# Patient Record
Sex: FEMALE
[De-identification: names, ages, dates, MRNs, and addresses within clinical notes are randomized per-mention and may not be internally consistent; named-entity substitution may affect disease eponyms.]

---

## 2023-08-24 PROBLEM — R06.02 SHORTNESS OF BREATH: Status: ACTIVE | Noted: 2023-08-24

## 2023-08-24 PROBLEM — F41.9 ANXIETY AND DEPRESSION: Status: ACTIVE | Noted: 2023-08-24

## 2023-08-24 PROBLEM — R52 PAIN: Status: ACTIVE | Noted: 2023-08-24

## 2023-08-24 PROBLEM — F39 MOOD DISORDER (CMS-HCC): Status: ACTIVE | Noted: 2023-08-24

## 2023-08-24 PROBLEM — M54.9 BACKACHE: Status: ACTIVE | Noted: 2023-08-24

## 2023-08-24 PROBLEM — R07.9 CHEST PAIN: Status: ACTIVE | Noted: 2023-08-24

## 2023-08-24 PROBLEM — E78.2 MIXED HYPERLIPIDEMIA: Status: ACTIVE | Noted: 2023-08-24

## 2023-08-24 PROBLEM — I10 ESSENTIAL HYPERTENSION: Status: ACTIVE | Noted: 2023-08-24

## 2023-08-24 PROBLEM — R09.1 PLEURISY: Status: ACTIVE | Noted: 2023-08-24

## 2023-08-24 PROBLEM — F32.A ANXIETY AND DEPRESSION: Status: ACTIVE | Noted: 2023-08-24

## 2023-08-24 RX ORDER — ARIPIPRAZOLE 5 MG/1
1 TABLET ORAL EVERY MORNING
COMMUNITY
End: 2023-10-09 | Stop reason: SDUPTHER

## 2023-08-24 RX ORDER — ALPRAZOLAM 0.25 MG/1
TABLET ORAL DAILY PRN
COMMUNITY
End: 2023-11-15 | Stop reason: SDUPTHER

## 2023-08-24 RX ORDER — VENLAFAXINE HYDROCHLORIDE 75 MG/1
1 CAPSULE, EXTENDED RELEASE ORAL 2 TIMES DAILY
COMMUNITY
End: 2023-10-09 | Stop reason: SDUPTHER

## 2023-10-06 ENCOUNTER — TELEPHONE (OUTPATIENT)
Dept: OTHER | Age: 68
End: 2023-10-06
Payer: MEDICARE

## 2023-10-06 NOTE — TELEPHONE ENCOUNTER
Requesting refills on medications.   Alprazolam 0.25  Aripiprazole 5mg  Venlafaxine 75mg     Thank you!

## 2023-10-09 ENCOUNTER — TELEPHONE (OUTPATIENT)
Dept: BEHAVIORAL HEALTH | Facility: CLINIC | Age: 68
End: 2023-10-09
Payer: MEDICARE

## 2023-10-09 DIAGNOSIS — F41.9 ANXIETY AND DEPRESSION: Primary | ICD-10-CM

## 2023-10-09 DIAGNOSIS — F32.A ANXIETY AND DEPRESSION: Primary | ICD-10-CM

## 2023-10-09 RX ORDER — VENLAFAXINE HYDROCHLORIDE 75 MG/1
75 CAPSULE, EXTENDED RELEASE ORAL 2 TIMES DAILY
Qty: 60 CAPSULE | Refills: 0 | Status: SHIPPED | OUTPATIENT
Start: 2023-10-09 | End: 2023-11-07

## 2023-10-09 RX ORDER — ARIPIPRAZOLE 5 MG/1
5 TABLET ORAL EVERY MORNING
Qty: 30 TABLET | Refills: 0 | Status: SHIPPED | OUTPATIENT
Start: 2023-10-09 | End: 2023-11-07

## 2023-10-11 ENCOUNTER — APPOINTMENT (OUTPATIENT)
Dept: BEHAVIORAL HEALTH | Facility: CLINIC | Age: 68
End: 2023-10-11
Payer: MEDICARE

## 2023-11-10 RX ORDER — VENLAFAXINE HYDROCHLORIDE 37.5 MG/1
CAPSULE, EXTENDED RELEASE ORAL
COMMUNITY
Start: 2023-06-23 | End: 2023-11-15 | Stop reason: WASHOUT

## 2023-11-10 RX ORDER — DICLOFENAC SODIUM 10 MG/G
GEL TOPICAL
COMMUNITY
Start: 2023-08-02

## 2023-11-15 ENCOUNTER — OFFICE VISIT (OUTPATIENT)
Dept: BEHAVIORAL HEALTH | Facility: CLINIC | Age: 68
End: 2023-11-15
Payer: MEDICARE

## 2023-11-15 VITALS
WEIGHT: 109.2 LBS | DIASTOLIC BLOOD PRESSURE: 73 MMHG | TEMPERATURE: 98.2 F | SYSTOLIC BLOOD PRESSURE: 134 MMHG | HEART RATE: 106 BPM | BODY MASS INDEX: 18.64 KG/M2 | HEIGHT: 64 IN

## 2023-11-15 DIAGNOSIS — F41.9 ANXIETY AND DEPRESSION: ICD-10-CM

## 2023-11-15 DIAGNOSIS — F32.A ANXIETY AND DEPRESSION: ICD-10-CM

## 2023-11-15 PROCEDURE — 1159F MED LIST DOCD IN RCRD: CPT | Performed by: PSYCHIATRY & NEUROLOGY

## 2023-11-15 PROCEDURE — 1160F RVW MEDS BY RX/DR IN RCRD: CPT | Performed by: PSYCHIATRY & NEUROLOGY

## 2023-11-15 PROCEDURE — 3075F SYST BP GE 130 - 139MM HG: CPT | Performed by: PSYCHIATRY & NEUROLOGY

## 2023-11-15 PROCEDURE — 99215 OFFICE O/P EST HI 40 MIN: CPT | Performed by: PSYCHIATRY & NEUROLOGY

## 2023-11-15 PROCEDURE — 3078F DIAST BP <80 MM HG: CPT | Performed by: PSYCHIATRY & NEUROLOGY

## 2023-11-15 RX ORDER — ARIPIPRAZOLE 5 MG/1
5 TABLET ORAL
Qty: 90 TABLET | Refills: 1 | Status: SHIPPED | OUTPATIENT
Start: 2023-11-15 | End: 2023-11-30 | Stop reason: SDUPTHER

## 2023-11-15 RX ORDER — VENLAFAXINE HYDROCHLORIDE 150 MG/1
150 CAPSULE, EXTENDED RELEASE ORAL DAILY
Qty: 90 CAPSULE | Refills: 1 | Status: SHIPPED | OUTPATIENT
Start: 2023-11-15 | End: 2023-11-30 | Stop reason: SINTOL

## 2023-11-15 RX ORDER — ALPRAZOLAM 0.25 MG/1
TABLET ORAL
Qty: 15 TABLET | Refills: 2 | Status: SHIPPED | OUTPATIENT
Start: 2023-11-15 | End: 2024-01-24 | Stop reason: WASHOUT

## 2023-11-15 ASSESSMENT — ENCOUNTER SYMPTOMS
NEUROLOGICAL NEGATIVE: 1
NERVOUS/ANXIOUS: 1

## 2023-11-15 NOTE — PROGRESS NOTES
Subjective   Patient ID: Ashanti Willis is a 68 y.o. female who presents for No chief complaint on file.. I am having a lot of anxiety.       The patient is alert, fully oriented, language is intact, and recent and remote memory intact. The patient denies any suicidal or homicidal ideation or plans. The patient presents with no manic or psychotic symptoms. Thought is logical and clear. No disturbances of judgment or insight are exhibited. No behavioral disturbances are present on examination. The patient report having a lot of anxiety. The patient is seen with her brother(Raulito Willis ). The patient lives with her mother and she is taking care of her mother who has dementia. The patient is working with her brother to save money so she can support herself. The patient's brother has been managing the patient's money since the patient has been a compulsive spender. The patient reports that she has complexes about her looks. She also has a history of anorexia. During high school the patient suffered from depression. People made fun of her in school. In public, she believes that people are judging her. The patient does not like what she sees when she looks in the mirror.  The patient hates looking in the mirror and hates what she sees. The patient counts in her head and if she does not do that she thinks something bad would happen. The patient continues to manifest obsessive compulsive behaviors.   The patient reports that when she went off the aripiprazole in the past she did very poorly with increased depression, diminished self image and increased discomfort with others.       Review of Systems   Neurological: Negative.         The patient is alert, fully oriented, language is intact, and recent and remote memory intact. The patient denies any suicidal or homicidal ideation or plans. The patient presents with no manic or psychotic symptoms. Thought is logical and clear. No disturbances of judgment or  insight are exhibited. No behavioral disturbances are present on examination. The patient report having a lot of anxiety. The patient is seen with her brother(Raulito Parmar [phone]). The patient lives with her mother and she is taking care of her mother who has dementia.        Psychiatric/Behavioral:  The patient is nervous/anxious.         The patient is alert, fully oriented, language is intact, and recent and remote memory intact. The patient denies any suicidal or homicidal ideation or plans. The patient presents with no manic or psychotic symptoms. Thought is logical and clear. No disturbances of judgment or insight are exhibited. No behavioral disturbances are present on examination. The patient report having a lot of anxiety. The patient is seen with her brother(Raulito Parmar [phone]). The patient lives with her mother and she is taking care of her mother who has dementia.        All other systems reviewed and are negative.      Objective   Physical Exam  Vitals reviewed.   Neurological:      General: No focal deficit present.      Mental Status: She is alert and oriented to person, place, and time. Mental status is at baseline.      Comments: The patient is alert, fully oriented, language is intact, and recent and remote memory intact. The patient denies any suicidal or homicidal ideation or plans. The patient presents with no manic or psychotic symptoms. Thought is logical and clear. No disturbances of judgment or insight are exhibited. No behavioral disturbances are present on examination. The patient report having a lot of anxiety. The patient is seen with her brother(Raulito Parmar [phone]). The patient lives with her mother and she is taking care of her mother who has dementia.        Psychiatric:      Comments: The patient is alert, fully oriented, language is intact, and recent and remote memory intact. The patient denies any suicidal or homicidal ideation or plans. The patient presents  with no manic or psychotic symptoms. Thought is logical and clear. No disturbances of judgment or insight are exhibited. No behavioral disturbances are present on examination. The patient report having a lot of anxiety. The patient is seen with her brother(Raulito Willis ). The patient lives with her mother and she is taking care of her mother who has dementia.              Lab Review:       Assessment/Plan   The risks, benefits & alternatives to the medications prescribed were explained to you and your support person, your brother, today. You & your support person were able to understand & repeat these risks, benefits & alternatives to this prescribed medication. You and your support persons have agreed to proceed with treatment with the medications discussed based on the conclusion that the benefit outweighs the risks of this treatment regimen: continue aripiprazole 5 mg daily, venlafaxine  mg daily and alprazolam 0.125 mg daily at bedtime. We recommend you resume your exercise program. We may consider increasing aripiprazole in the future for your self image and fears as well as your depression. We  may consider increasing venlafaxine  or switch to another agent in the future to deal with your obsessive compulsive depressive features. Follow up in 4 to 6 weeks or sooner if needed.

## 2023-11-30 DIAGNOSIS — F39 MOOD DISORDER (CMS-HCC): ICD-10-CM

## 2023-11-30 DIAGNOSIS — F41.9 ANXIETY AND DEPRESSION: ICD-10-CM

## 2023-11-30 DIAGNOSIS — F32.A ANXIETY AND DEPRESSION: ICD-10-CM

## 2023-11-30 RX ORDER — VENLAFAXINE HYDROCHLORIDE 75 MG/1
75 CAPSULE, EXTENDED RELEASE ORAL 2 TIMES DAILY
Qty: 180 CAPSULE | Refills: 1 | Status: SHIPPED | OUTPATIENT
Start: 2023-11-30 | End: 2024-01-24 | Stop reason: SDUPTHER

## 2023-11-30 RX ORDER — ARIPIPRAZOLE 5 MG/1
5 TABLET ORAL
Qty: 90 TABLET | Refills: 1 | Status: SHIPPED | OUTPATIENT
Start: 2023-11-30 | End: 2024-01-24 | Stop reason: SDUPTHER

## 2024-01-09 ENCOUNTER — APPOINTMENT (OUTPATIENT)
Dept: BEHAVIORAL HEALTH | Facility: CLINIC | Age: 69
End: 2024-01-09
Payer: MEDICARE

## 2024-01-10 ENCOUNTER — APPOINTMENT (OUTPATIENT)
Dept: BEHAVIORAL HEALTH | Facility: CLINIC | Age: 69
End: 2024-01-10
Payer: MEDICARE

## 2024-01-15 ENCOUNTER — APPOINTMENT (OUTPATIENT)
Dept: BEHAVIORAL HEALTH | Facility: CLINIC | Age: 69
End: 2024-01-15
Payer: MEDICARE

## 2024-01-24 ENCOUNTER — OFFICE VISIT (OUTPATIENT)
Dept: BEHAVIORAL HEALTH | Facility: CLINIC | Age: 69
End: 2024-01-24
Payer: MEDICARE

## 2024-01-24 DIAGNOSIS — Z79.899 MEDICATION MANAGEMENT: ICD-10-CM

## 2024-01-24 DIAGNOSIS — R41.3 MEMORY LOSS, SHORT TERM: ICD-10-CM

## 2024-01-24 DIAGNOSIS — F32.A ANXIETY AND DEPRESSION: ICD-10-CM

## 2024-01-24 DIAGNOSIS — F41.9 ANXIETY AND DEPRESSION: ICD-10-CM

## 2024-01-24 DIAGNOSIS — R41.840 COGNITIVE ATTENTION DEFICIT: ICD-10-CM

## 2024-01-24 DIAGNOSIS — R41.844 EXECUTIVE FUNCTION DEFICIT: ICD-10-CM

## 2024-01-24 DIAGNOSIS — F39 MOOD DISORDER (CMS-HCC): ICD-10-CM

## 2024-01-24 PROCEDURE — 99215 OFFICE O/P EST HI 40 MIN: CPT | Performed by: PSYCHIATRY & NEUROLOGY

## 2024-01-24 PROCEDURE — 1160F RVW MEDS BY RX/DR IN RCRD: CPT | Performed by: PSYCHIATRY & NEUROLOGY

## 2024-01-24 PROCEDURE — 1159F MED LIST DOCD IN RCRD: CPT | Performed by: PSYCHIATRY & NEUROLOGY

## 2024-01-24 RX ORDER — ARIPIPRAZOLE 5 MG/1
5 TABLET ORAL
Qty: 90 TABLET | Refills: 1 | Status: SHIPPED | OUTPATIENT
Start: 2024-01-24 | End: 2024-04-24 | Stop reason: SDUPTHER

## 2024-01-24 RX ORDER — VENLAFAXINE HYDROCHLORIDE 75 MG/1
75 CAPSULE, EXTENDED RELEASE ORAL 2 TIMES DAILY
Qty: 180 CAPSULE | Refills: 1 | Status: SHIPPED | OUTPATIENT
Start: 2024-01-24 | End: 2024-04-24 | Stop reason: SDUPTHER

## 2024-01-24 SDOH — HEALTH STABILITY: PHYSICAL HEALTH: ON AVERAGE, HOW MANY MINUTES DO YOU ENGAGE IN EXERCISE AT THIS LEVEL?: 10 MIN

## 2024-01-24 SDOH — ECONOMIC STABILITY: HOUSING INSECURITY: IN THE LAST 12 MONTHS, HOW MANY PLACES HAVE YOU LIVED?: 1

## 2024-01-24 SDOH — ECONOMIC STABILITY: HOUSING INSECURITY
IN THE LAST 12 MONTHS, WAS THERE A TIME WHEN YOU DID NOT HAVE A STEADY PLACE TO SLEEP OR SLEPT IN A SHELTER (INCLUDING NOW)?: NO

## 2024-01-24 SDOH — ECONOMIC STABILITY: FOOD INSECURITY: WITHIN THE PAST 12 MONTHS, THE FOOD YOU BOUGHT JUST DIDN'T LAST AND YOU DIDN'T HAVE MONEY TO GET MORE.: NEVER TRUE

## 2024-01-24 SDOH — ECONOMIC STABILITY: TRANSPORTATION INSECURITY
IN THE PAST 12 MONTHS, HAS THE LACK OF TRANSPORTATION KEPT YOU FROM MEDICAL APPOINTMENTS OR FROM GETTING MEDICATIONS?: NO

## 2024-01-24 SDOH — ECONOMIC STABILITY: FOOD INSECURITY: WITHIN THE PAST 12 MONTHS, YOU WORRIED THAT YOUR FOOD WOULD RUN OUT BEFORE YOU GOT MONEY TO BUY MORE.: NEVER TRUE

## 2024-01-24 SDOH — ECONOMIC STABILITY: INCOME INSECURITY: IN THE LAST 12 MONTHS, WAS THERE A TIME WHEN YOU WERE NOT ABLE TO PAY THE MORTGAGE OR RENT ON TIME?: NO

## 2024-01-24 SDOH — ECONOMIC STABILITY: TRANSPORTATION INSECURITY
IN THE PAST 12 MONTHS, HAS LACK OF TRANSPORTATION KEPT YOU FROM MEETINGS, WORK, OR FROM GETTING THINGS NEEDED FOR DAILY LIVING?: NO

## 2024-01-24 SDOH — ECONOMIC STABILITY: GENERAL: WHICH OF THE FOLLOWING DO YOU KNOW HOW TO USE AND HAVE ACCESS TO EVERY DAY? (CHOOSE ALL THAT APPLY): NONE OF THESE

## 2024-01-24 SDOH — ECONOMIC STABILITY: GENERAL
WHICH OF THE FOLLOWING WOULD YOU LIKE TO GET CONNECTED TO IN ORDER TO RECEIVE A DISCOUNT OR FOR FREE? (CHOOSE ALL THAT APPLY): NONE OF THESE

## 2024-01-24 SDOH — HEALTH STABILITY: PHYSICAL HEALTH: ON AVERAGE, HOW MANY DAYS PER WEEK DO YOU ENGAGE IN MODERATE TO STRENUOUS EXERCISE (LIKE A BRISK WALK)?: 7 DAYS

## 2024-01-24 ASSESSMENT — PATIENT HEALTH QUESTIONNAIRE - PHQ9
SUM OF ALL RESPONSES TO PHQ9 QUESTIONS 1 & 2: 2
2. FEELING DOWN, DEPRESSED OR HOPELESS: SEVERAL DAYS
10. IF YOU CHECKED OFF ANY PROBLEMS, HOW DIFFICULT HAVE THESE PROBLEMS MADE IT FOR YOU TO DO YOUR WORK, TAKE CARE OF THINGS AT HOME, OR GET ALONG WITH OTHER PEOPLE: SOMEWHAT DIFFICULT
1. LITTLE INTEREST OR PLEASURE IN DOING THINGS: SEVERAL DAYS

## 2024-01-24 ASSESSMENT — SOCIAL DETERMINANTS OF HEALTH (SDOH)
HOW HARD IS IT FOR YOU TO PAY FOR THE VERY BASICS LIKE FOOD, HOUSING, MEDICAL CARE, AND HEATING?: NOT HARD AT ALL
WITHIN THE LAST YEAR, HAVE TO BEEN RAPED OR FORCED TO HAVE ANY KIND OF SEXUAL ACTIVITY BY YOUR PARTNER OR EX-PARTNER?: NO
WITHIN THE LAST YEAR, HAVE YOU BEEN HUMILIATED OR EMOTIONALLY ABUSED IN OTHER WAYS BY YOUR PARTNER OR EX-PARTNER?: NO
HOW OFTEN DO YOU GET TOGETHER WITH FRIENDS OR RELATIVES?: MORE THAN THREE TIMES A WEEK
DO YOU BELONG TO ANY CLUBS OR ORGANIZATIONS SUCH AS CHURCH GROUPS UNIONS, FRATERNAL OR ATHLETIC GROUPS, OR SCHOOL GROUPS?: NO
HOW OFTEN DO YOU ATTENT MEETINGS OF THE CLUB OR ORGANIZATION YOU BELONG TO?: 1 TO 4 TIMES PER YEAR
WITHIN THE LAST YEAR, HAVE YOU BEEN AFRAID OF YOUR PARTNER OR EX-PARTNER?: NO
HOW OFTEN DO YOU ATTEND CHURCH OR RELIGIOUS SERVICES?: 1 TO 4 TIMES PER YEAR
IN THE PAST 12 MONTHS, HAS THE ELECTRIC, GAS, OIL, OR WATER COMPANY THREATENED TO SHUT OFF SERVICE IN YOUR HOME?: NO
IN A TYPICAL WEEK, HOW MANY TIMES DO YOU TALK ON THE PHONE WITH FAMILY, FRIENDS, OR NEIGHBORS?: MORE THAN THREE TIMES A WEEK
WITHIN THE LAST YEAR, HAVE YOU BEEN KICKED, HIT, SLAPPED, OR OTHERWISE PHYSICALLY HURT BY YOUR PARTNER OR EX-PARTNER?: NO

## 2024-01-24 ASSESSMENT — LIFESTYLE VARIABLES
HOW OFTEN DO YOU HAVE A DRINK CONTAINING ALCOHOL: NEVER
AUDIT-C TOTAL SCORE: 0
SKIP TO QUESTIONS 9-10: 1
HOW MANY STANDARD DRINKS CONTAINING ALCOHOL DO YOU HAVE ON A TYPICAL DAY: PATIENT DOES NOT DRINK
HOW OFTEN DO YOU HAVE SIX OR MORE DRINKS ON ONE OCCASION: NEVER

## 2024-01-24 ASSESSMENT — ENCOUNTER SYMPTOMS
NERVOUS/ANXIOUS: 1
DYSPHORIC MOOD: 1

## 2024-01-24 NOTE — PROGRESS NOTES
Subjective   Patient ID: Ashanti Willis is a 68 y.o. female who presents for No chief complaint on file. I have been extra tired.     The patient is alert, fully oriented, language is intact, and recent and remote memory intact. The patient denies any suicidal or homicidal ideation or plans. The patient presents with no depressive, manic or psychotic symptoms. Thought is logical and clear. No disturbances of judgment or insight are exhibited. No behavioral disturbances are present on examination.        Review of Systems   Neurological:         The patient is alert, fully oriented, language is intact, and recent and remote memory intact. The patient denies any suicidal or homicidal ideation or plans. The patient presents with no depressive, manic or psychotic symptoms. Thought is logical and clear. No disturbances of judgment or insight are exhibited. No behavioral disturbances are present on examination.     Psychiatric/Behavioral:  Positive for dysphoric mood. The patient is nervous/anxious.         The patient is alert, fully oriented, language is intact, and recent and remote memory intact. The patient denies any suicidal or homicidal ideation or plans. The patient presents with no depressive, manic or psychotic symptoms. Thought is logical and clear. No disturbances of judgment or insight are exhibited. No behavioral disturbances are present on examination.     All other systems reviewed and are negative.      Objective   Physical Exam  Neurological:      General: No focal deficit present.      Mental Status: She is alert.      Comments: The patient is alert, fully oriented, language is intact, and recent and remote memory intact. The patient denies any suicidal or homicidal ideation or plans. The patient presents with no depressive, manic or psychotic symptoms. Thought is logical and clear. No disturbances of judgment or insight are exhibited. No behavioral disturbances are present on examination.      Psychiatric:      Comments: The patient is alert, fully oriented, language is intact, and recent and remote memory intact. The patient denies any suicidal or homicidal ideation or plans. The patient presents with no depressive, manic or psychotic symptoms. Thought is logical and clear. No disturbances of judgment or insight are exhibited. No behavioral disturbances are present on examination.           Lab Review:       Assessment/Plan   The FDA risks, benefits & alternatives to the medications prescribed were explained to you and your support person, your son, today. You & your support person were able to understand & repeat these risks, benefits & alternatives to these prescribed medications. You and your support person, your son, have agreed to proceed with treatment with the medications discussed based on the conclusion that the benefit outweighs the risks of this treatment regimen: continue aripiprazole 5 mg daily and venlafaxine XR 75 mg twice daily. You report no longer taking alprazolam, we have ordered a benzodiazepine screen for confirmation. We have ordered an MRI and EKG for work up of cognitive, executive function deficits with recent memory loss.   Follow up in late April at [facility] and in July at [facility] all in person with her son.

## 2024-01-31 ENCOUNTER — HOSPITAL ENCOUNTER (OUTPATIENT)
Dept: CARDIOLOGY | Facility: HOSPITAL | Age: 69
Discharge: HOME | End: 2024-01-31
Payer: MEDICARE

## 2024-01-31 DIAGNOSIS — R41.3 MEMORY LOSS, SHORT TERM: ICD-10-CM

## 2024-01-31 DIAGNOSIS — R41.840 COGNITIVE ATTENTION DEFICIT: ICD-10-CM

## 2024-01-31 DIAGNOSIS — F32.A ANXIETY AND DEPRESSION: ICD-10-CM

## 2024-01-31 DIAGNOSIS — F39 MOOD DISORDER (CMS-HCC): ICD-10-CM

## 2024-01-31 DIAGNOSIS — R41.844 EXECUTIVE FUNCTION DEFICIT: ICD-10-CM

## 2024-01-31 DIAGNOSIS — F41.9 ANXIETY AND DEPRESSION: ICD-10-CM

## 2024-01-31 PROCEDURE — 93005 ELECTROCARDIOGRAM TRACING: CPT

## 2024-02-01 ENCOUNTER — HOSPITAL ENCOUNTER (OUTPATIENT)
Dept: RADIOLOGY | Facility: HOSPITAL | Age: 69
Discharge: HOME | End: 2024-02-01
Payer: MEDICARE

## 2024-02-01 DIAGNOSIS — R41.844 EXECUTIVE FUNCTION DEFICIT: ICD-10-CM

## 2024-02-01 DIAGNOSIS — R41.3 MEMORY LOSS, SHORT TERM: ICD-10-CM

## 2024-02-01 DIAGNOSIS — R41.840 COGNITIVE ATTENTION DEFICIT: ICD-10-CM

## 2024-02-01 PROCEDURE — 70551 MRI BRAIN STEM W/O DYE: CPT

## 2024-03-12 ENCOUNTER — APPOINTMENT (OUTPATIENT)
Dept: BEHAVIORAL HEALTH | Facility: CLINIC | Age: 69
End: 2024-03-12
Payer: MEDICARE

## 2024-04-02 DIAGNOSIS — F39 MOOD DISORDER (CMS-HCC): ICD-10-CM

## 2024-04-02 DIAGNOSIS — F41.8 MIXED ANXIETY AND DEPRESSIVE DISORDER: ICD-10-CM

## 2024-04-02 DIAGNOSIS — F41.9 ANXIETY AND DEPRESSION: ICD-10-CM

## 2024-04-02 DIAGNOSIS — F32.A ANXIETY AND DEPRESSION: ICD-10-CM

## 2024-04-02 RX ORDER — ALPRAZOLAM 0.25 MG/1
TABLET ORAL DAILY
Qty: 15 TABLET | Refills: 0 | Status: SHIPPED | OUTPATIENT
Start: 2024-04-02 | End: 2024-04-30 | Stop reason: SDUPTHER

## 2024-04-24 ENCOUNTER — OFFICE VISIT (OUTPATIENT)
Dept: BEHAVIORAL HEALTH | Facility: CLINIC | Age: 69
End: 2024-04-24
Payer: MEDICARE

## 2024-04-24 VITALS
BODY MASS INDEX: 18.16 KG/M2 | SYSTOLIC BLOOD PRESSURE: 125 MMHG | HEART RATE: 99 BPM | WEIGHT: 106.4 LBS | DIASTOLIC BLOOD PRESSURE: 76 MMHG | HEIGHT: 64 IN | TEMPERATURE: 98.4 F

## 2024-04-24 DIAGNOSIS — R41.844 EXECUTIVE FUNCTION DEFICIT: ICD-10-CM

## 2024-04-24 DIAGNOSIS — F32.A ANXIETY AND DEPRESSION: ICD-10-CM

## 2024-04-24 DIAGNOSIS — F41.9 ANXIETY AND DEPRESSION: ICD-10-CM

## 2024-04-24 DIAGNOSIS — Z79.899 MEDICATION MANAGEMENT: ICD-10-CM

## 2024-04-24 DIAGNOSIS — F31.70 BIPOLAR AFFECTIVE DISORDER IN REMISSION (MULTI): ICD-10-CM

## 2024-04-24 DIAGNOSIS — F09 COGNITIVE AND NEUROBEHAVIORAL DYSFUNCTION: ICD-10-CM

## 2024-04-24 DIAGNOSIS — F39 MOOD DISORDER (CMS-HCC): ICD-10-CM

## 2024-04-24 PROCEDURE — 3078F DIAST BP <80 MM HG: CPT | Performed by: PSYCHIATRY & NEUROLOGY

## 2024-04-24 PROCEDURE — 80346 BENZODIAZEPINES1-12: CPT

## 2024-04-24 PROCEDURE — 1160F RVW MEDS BY RX/DR IN RCRD: CPT | Performed by: PSYCHIATRY & NEUROLOGY

## 2024-04-24 PROCEDURE — 99215 OFFICE O/P EST HI 40 MIN: CPT | Performed by: PSYCHIATRY & NEUROLOGY

## 2024-04-24 PROCEDURE — 1159F MED LIST DOCD IN RCRD: CPT | Performed by: PSYCHIATRY & NEUROLOGY

## 2024-04-24 PROCEDURE — 3074F SYST BP LT 130 MM HG: CPT | Performed by: PSYCHIATRY & NEUROLOGY

## 2024-04-24 PROCEDURE — G2212 PROLONG OUTPT/OFFICE VIS: HCPCS | Performed by: PSYCHIATRY & NEUROLOGY

## 2024-04-24 RX ORDER — ARIPIPRAZOLE 5 MG/1
5 TABLET ORAL
Qty: 90 TABLET | Refills: 1 | Status: SHIPPED | OUTPATIENT
Start: 2024-04-24 | End: 2024-10-21

## 2024-04-24 RX ORDER — VENLAFAXINE HYDROCHLORIDE 75 MG/1
75 CAPSULE, EXTENDED RELEASE ORAL 2 TIMES DAILY
Qty: 180 CAPSULE | Refills: 1 | Status: SHIPPED | OUTPATIENT
Start: 2024-04-24 | End: 2024-10-21

## 2024-04-24 ASSESSMENT — ENCOUNTER SYMPTOMS
NERVOUS/ANXIOUS: 1
DYSPHORIC MOOD: 1

## 2024-04-24 NOTE — PROGRESS NOTES
Subjective   Patient ID: Ashanti Willis is a 68 y.o. female who presents for No chief complaint on file. I went off the aripiprazole and then I did very badly so I went back on it and I am doing much better.     HPI: The patient reports that when she went off the aripiprazole her thoughts became confused, she could not function and she became depressed. As a result she resumed the medication on her own and is advising me today of this. The patient is seen with her brother (Raulito Willis ). Obie reports that the patient tends to overspend and is compulsive. Obie manages the patient's finances and checks on the patient daily as his home is walking distance.     PMH: The patient had been seen by Dr. English. The patient was hospitalized for a day at [facility]. There were no history of suicide attempts. The patient has a history of ECT.     MSE: The patient is alert, fully oriented, language is intact, and recent and remote memory intact. The patient denies any suicidal or homicidal ideation or plans. The patient presents with no depressive, manic or psychotic symptoms. Thought is logical and clear. Judgment and insight are limited. The patient has had symptoms of disorganization, racing thoughts and sleep disturbance off aripiprazole which have remitted once she resumed the aripiprazole. A MoCA was completed on 4/24/24 and she scored a (23+1)/30. She scored 3/5 on visuospatial with not successfully completing the trail or cube. She was able to successfully do the clock. Recall was 4/5 with deficits in attention, concentration and language. She also has a hearing impairment. The patient has a history of obsessive and compulsive features as well.         Review of Systems   Neurological:         MSE: The patient is alert, fully oriented, language is intact, and recent and remote memory intact. The patient denies any suicidal or homicidal ideation or plans. The patient presents with no depressive, manic or  psychotic symptoms. Thought is logical and clear. Judgment and insight are limited. The patient has had symptoms of disorganization, racing thoughts and sleep disturbance off aripiprazole which have remitted once she resumed the aripiprazole. A MoCA was completed on 4/24/24 and she scored a (23+1)/30. She scored 3/5 on visuospatial with not successfully completing the trail or cube. She was able to successfully do the clock. Recall was 4/5 with deficits in attention, concentration and language. She also has a hearing impairment. The patient has a history of obsessive and compulsive features as well.          Psychiatric/Behavioral:  Positive for dysphoric mood. The patient is nervous/anxious.         MSE: The patient is alert, fully oriented, language is intact, and recent and remote memory intact. The patient denies any suicidal or homicidal ideation or plans. The patient presents with no depressive, manic or psychotic symptoms. Thought is logical and clear. Judgment and insight are limited. The patient has had symptoms of disorganization, racing thoughts and sleep disturbance off aripiprazole which have remitted once she resumed the aripiprazole. A MoCA was completed on 4/24/24 and she scored a (23+1)/30. She scored 3/5 on visuospatial with not successfully completing the trail or cube. She was able to successfully do the clock. Recall was 4/5 with deficits in attention, concentration and language. She also has a hearing impairment. The patient has a history of obsessive and compulsive features as well.          All other systems reviewed and are negative.      Objective   Physical Exam  Neurological:      General: No focal deficit present.      Mental Status: She is alert.      Comments: MSE: The patient is alert, fully oriented, language is intact, and recent and remote memory intact. The patient denies any suicidal or homicidal ideation or plans. The patient presents with no depressive, manic or psychotic  symptoms. Thought is logical and clear. Judgment and insight are limited. The patient has had symptoms of disorganization, racing thoughts and sleep disturbance off aripiprazole which have remitted once she resumed the aripiprazole. A MoCA was completed on 4/24/24 and she scored a (23+1)/30. She scored 3/5 on visuospatial with not successfully completing the trail or cube. She was able to successfully do the clock. Recall was 4/5 with deficits in attention, concentration and language. She also has a hearing impairment. The patient has a history of obsessive and compulsive features as well.        Psychiatric:      Comments: MSE: The patient is alert, fully oriented, language is intact, and recent and remote memory intact. The patient denies any suicidal or homicidal ideation or plans. The patient presents with no depressive, manic or psychotic symptoms. Thought is logical and clear. Judgment and insight are limited. The patient has had symptoms of disorganization, racing thoughts and sleep disturbance off aripiprazole which have remitted once she resumed the aripiprazole. A MoCA was completed on 4/24/24 and she scored a (23+1)/30. She scored 3/5 on visuospatial with not successfully completing the trail or cube. She was able to successfully do the clock. Recall was 4/5 with deficits in attention, concentration and language. She also has a hearing impairment. The patient has a history of obsessive and compulsive features as well.                Lab Review:       Assessment/Plan   The FDA risks, benefits & alternatives to the medications prescribed were explained to you and your support person, your brother Obie, today. You & your support person were able to understand & repeat these risks, benefits & alternatives to these prescribed medications. You and your support person, Raulito Willis, have agreed to proceed with treatment with the medications discussed based on the conclusion that the benefit outweighs the risks of  this treatment regimen: continue aripiprazole 5 mg daily and venlafaxine XR 75 mg twice daily. You report taking alprazolam 0.25 mg at night.    We have ordered a benzodiazepine screen for confirmation to be done today 4/24/24.     We have your MRI and EKG  with you and your brother Rauilto Willis. The MRI does show brain loss as discussed. The EKG results were not available.    We have made a referral to Dr. Sabas Arcos for neuropsychological testing at .     Follow up in July of 2024 at the [facility] in person with your brother, Obie.                        Psych Review of Symptoms:    ADHD: Patient denied any symptoms.         Anxiety:   Generalized Anxiety Symptoms: Difficulty controlling worry and excessive worry.       Developmental and Sensory Concerns: Patient denied any symptoms.         Depressive Symptoms: Patient denied any symptoms.         Disruptive and Conduct Symptoms: Patient denied any symptoms.         Eating / Feeding Concerns: Patient denied any symptoms.         Elimination Symptoms: Patient denied any symptoms.         Manic Symptoms: Patient denied any symptoms.         Obsessive-Compulsive Symptoms:   Compulsive behaviors and obsessive behaviors.       Psychotic Symptoms: Patient denied any symptoms.           Trauma Related Symptoms: Patient denied any symptoms.           Sleep Concerns: Patient denied any symptoms.

## 2024-04-26 DIAGNOSIS — F41.9 ANXIETY AND DEPRESSION: ICD-10-CM

## 2024-04-26 DIAGNOSIS — F31.70 BIPOLAR AFFECTIVE DISORDER IN REMISSION (MULTI): ICD-10-CM

## 2024-04-26 DIAGNOSIS — F32.A ANXIETY AND DEPRESSION: ICD-10-CM

## 2024-04-30 DIAGNOSIS — F41.8 MIXED ANXIETY AND DEPRESSIVE DISORDER: ICD-10-CM

## 2024-04-30 RX ORDER — ALPRAZOLAM 0.25 MG/1
25 TABLET ORAL DAILY
Qty: 15 TABLET | Refills: 0 | OUTPATIENT
Start: 2024-04-30

## 2024-04-30 RX ORDER — ALPRAZOLAM 0.25 MG/1
TABLET ORAL
Qty: 15 TABLET | Refills: 2 | Status: SHIPPED | OUTPATIENT
Start: 2024-04-30

## 2024-05-06 LAB
1OH-MIDAZOLAM UR CFM-MCNC: <25 NG/ML
7AMINOCLONAZEPAM UR CFM-MCNC: <25 NG/ML
A-OH ALPRAZ UR CFM-MCNC: <25 NG/ML
ALPRAZ UR CFM-MCNC: <25 NG/ML
CHLORDIAZEP UR CFM-MCNC: <25 NG/ML
CLONAZEPAM UR CFM-MCNC: <25 NG/ML
DIAZEPAM UR CFM-MCNC: <25 NG/ML
LORAZEPAM UR CFM-MCNC: <25 NG/ML
MIDAZOLAM UR CFM-MCNC: <25 NG/ML
NORDIAZEPAM UR CFM-MCNC: <25 NG/ML
OXAZEPAM UR CFM-MCNC: <25 NG/ML
TEMAZEPAM UR CFM-MCNC: <25 NG/ML

## 2024-06-12 DIAGNOSIS — F32.A ANXIETY AND DEPRESSION: ICD-10-CM

## 2024-06-12 DIAGNOSIS — F41.9 ANXIETY AND DEPRESSION: ICD-10-CM

## 2024-06-12 DIAGNOSIS — F39 MOOD DISORDER (CMS-HCC): ICD-10-CM

## 2024-06-13 RX ORDER — ARIPIPRAZOLE 5 MG/1
5 TABLET ORAL
Qty: 90 TABLET | Refills: 1 | Status: SHIPPED | OUTPATIENT
Start: 2024-06-13 | End: 2024-12-10

## 2024-06-13 RX ORDER — VENLAFAXINE HYDROCHLORIDE 75 MG/1
75 CAPSULE, EXTENDED RELEASE ORAL 2 TIMES DAILY
Qty: 180 CAPSULE | Refills: 1 | Status: SHIPPED | OUTPATIENT
Start: 2024-06-13 | End: 2024-12-10

## 2024-07-09 ENCOUNTER — OFFICE VISIT (OUTPATIENT)
Dept: BEHAVIORAL HEALTH | Facility: CLINIC | Age: 69
End: 2024-07-09
Payer: MEDICARE

## 2024-07-09 VITALS
RESPIRATION RATE: 16 BRPM | WEIGHT: 105.6 LBS | BODY MASS INDEX: 18.13 KG/M2 | DIASTOLIC BLOOD PRESSURE: 76 MMHG | HEART RATE: 96 BPM | SYSTOLIC BLOOD PRESSURE: 115 MMHG | TEMPERATURE: 98.5 F

## 2024-07-09 DIAGNOSIS — F39 MOOD DISORDER (CMS-HCC): ICD-10-CM

## 2024-07-09 DIAGNOSIS — F41.9 ANXIETY AND DEPRESSION: ICD-10-CM

## 2024-07-09 DIAGNOSIS — F32.A ANXIETY AND DEPRESSION: ICD-10-CM

## 2024-07-09 DIAGNOSIS — F42.2 MIXED OBSESSIONAL THOUGHTS AND ACTS: ICD-10-CM

## 2024-07-09 DIAGNOSIS — G31.84 MCI (MILD COGNITIVE IMPAIRMENT) WITH MEMORY LOSS: ICD-10-CM

## 2024-07-09 PROCEDURE — 3074F SYST BP LT 130 MM HG: CPT | Performed by: PSYCHIATRY & NEUROLOGY

## 2024-07-09 PROCEDURE — 99215 OFFICE O/P EST HI 40 MIN: CPT | Performed by: PSYCHIATRY & NEUROLOGY

## 2024-07-09 PROCEDURE — 99215 OFFICE O/P EST HI 40 MIN: CPT | Mod: AM | Performed by: PSYCHIATRY & NEUROLOGY

## 2024-07-09 PROCEDURE — 1126F AMNT PAIN NOTED NONE PRSNT: CPT | Performed by: PSYCHIATRY & NEUROLOGY

## 2024-07-09 PROCEDURE — 1036F TOBACCO NON-USER: CPT | Performed by: PSYCHIATRY & NEUROLOGY

## 2024-07-09 PROCEDURE — 1159F MED LIST DOCD IN RCRD: CPT | Performed by: PSYCHIATRY & NEUROLOGY

## 2024-07-09 PROCEDURE — 3078F DIAST BP <80 MM HG: CPT | Performed by: PSYCHIATRY & NEUROLOGY

## 2024-07-09 PROCEDURE — 1160F RVW MEDS BY RX/DR IN RCRD: CPT | Performed by: PSYCHIATRY & NEUROLOGY

## 2024-07-09 RX ORDER — VENLAFAXINE HYDROCHLORIDE 75 MG/1
75 CAPSULE, EXTENDED RELEASE ORAL
Qty: 90 CAPSULE | Refills: 1 | Status: SHIPPED | OUTPATIENT
Start: 2024-07-09 | End: 2025-01-05

## 2024-07-09 RX ORDER — VENLAFAXINE HYDROCHLORIDE 150 MG/1
150 CAPSULE, EXTENDED RELEASE ORAL
Qty: 90 CAPSULE | Refills: 1 | Status: SHIPPED | OUTPATIENT
Start: 2024-07-09 | End: 2025-01-05

## 2024-07-09 ASSESSMENT — ENCOUNTER SYMPTOMS
OCCASIONAL FEELINGS OF UNSTEADINESS: 1
LOSS OF SENSATION IN FEET: 0
DYSPHORIC MOOD: 1
NERVOUS/ANXIOUS: 1

## 2024-07-09 ASSESSMENT — PATIENT HEALTH QUESTIONNAIRE - PHQ9
1. LITTLE INTEREST OR PLEASURE IN DOING THINGS: NOT AT ALL
SUM OF ALL RESPONSES TO PHQ9 QUESTIONS 1 AND 2: 0
2. FEELING DOWN, DEPRESSED OR HOPELESS: NOT AT ALL

## 2024-07-09 ASSESSMENT — PAIN SCALES - GENERAL: PAINLEVEL: 0-NO PAIN

## 2024-07-09 NOTE — PROGRESS NOTES
Subjective   Patient ID: Ashanti Willis is a 69 y.o. female who presents for Follow-up I worry a lot and I have a routine    HPI: The patient reports that she is concerned about handling stresses. The patient is seen with her brother (Raulito Willis ). Obie reports that the patient tends to overspend and is compulsive. Obie manages the patient's finances and checks on the patient daily as his home is walking distance.     PMH: The patient had been seen by Dr. English. The patient was hospitalized for a day at [facility]. There were no history of suicide attempts. The patient has a history of ECT.     MSE: The patient is alert, fully oriented, language is intact, and recent and remote memory intact. The patient denies any suicidal or homicidal ideation or plans. The patient presents with no depressive, manic or psychotic symptoms. Thought is logical and clear. Judgment and insight are limited. The patient has had symptoms of disorganization, racing thoughts and sleep disturbance off aripiprazole which have remitted once she resumed the aripiprazole. A MoCA was completed on 4/24/24 and she scored a (23+1)/30. She scored 3/5 on visuospatial with not successfully completing the trail or cube. She was able to successfully do the clock. Recall was 4/5 with deficits in attention, concentration and language. She also has a hearing impairment. The patient has a history of obsessive and compulsive features as well.         Review of Systems   Neurological:         MSE: The patient is alert, fully oriented, language is intact, and recent and remote memory intact. The patient denies any suicidal or homicidal ideation or plans. The patient presents with no depressive, manic or psychotic symptoms. Thought is logical and clear. Judgment and insight are limited. The patient has had symptoms of disorganization, racing thoughts and sleep disturbance off aripiprazole which have remitted once she resumed the aripiprazole. A  MoCA was completed on 4/24/24 and she scored a (23+1)/30. She scored 3/5 on visuospatial with not successfully completing the trail or cube. She was able to successfully do the clock. Recall was 4/5 with deficits in attention, concentration and language. She also has a hearing impairment. The patient has a history of obsessive and compulsive features as well.          Psychiatric/Behavioral:  Positive for dysphoric mood. The patient is nervous/anxious.         MSE: The patient is alert, fully oriented, language is intact, and recent and remote memory intact. The patient denies any suicidal or homicidal ideation or plans. The patient presents with no depressive, manic or psychotic symptoms. Thought is logical and clear. Judgment and insight are limited. The patient has had symptoms of disorganization, racing thoughts and sleep disturbance off aripiprazole which have remitted once she resumed the aripiprazole. A MoCA was completed on 4/24/24 and she scored a (23+1)/30. She scored 3/5 on visuospatial with not successfully completing the trail or cube. She was able to successfully do the clock. Recall was 4/5 with deficits in attention, concentration and language. She also has a hearing impairment. The patient has a history of obsessive and compulsive features as well.          All other systems reviewed and are negative.      Objective   Physical Exam  Neurological:      General: No focal deficit present.      Mental Status: She is alert.      Comments: MSE: The patient is alert, fully oriented, language is intact, and recent and remote memory intact. The patient denies any suicidal or homicidal ideation or plans. The patient presents with no depressive, manic or psychotic symptoms. Thought is logical and clear. Judgment and insight are limited. The patient has had symptoms of disorganization, racing thoughts and sleep disturbance off aripiprazole which have remitted once she resumed the aripiprazole. A MoCA was  completed on 4/24/24 and she scored a (23+1)/30. She scored 3/5 on visuospatial with not successfully completing the trail or cube. She was able to successfully do the clock. Recall was 4/5 with deficits in attention, concentration and language. She also has a hearing impairment. The patient has a history of obsessive and compulsive features as well.        Psychiatric:      Comments: MSE: The patient is alert, fully oriented, language is intact, and recent and remote memory intact. The patient denies any suicidal or homicidal ideation or plans. The patient presents with no depressive, manic or psychotic symptoms. Thought is logical and clear. Judgment and insight are limited. The patient has had symptoms of disorganization, racing thoughts and sleep disturbance off aripiprazole which have remitted once she resumed the aripiprazole. A MoCA was completed on 4/24/24 and she scored a (23+1)/30. She scored 3/5 on visuospatial with not successfully completing the trail or cube. She was able to successfully do the clock. Recall was 4/5 with deficits in attention, concentration and language. She also has a hearing impairment. The patient has a history of obsessive and compulsive features as well.                Lab Review:       Assessment/Plan   Psychiatric Risk Assessment  Violence Risk Assessment: none  Acute Risk of Harm to Others is Considered: low   Suicide Risk Assessment: age > 65 yrs old and   Protective Factors against Suicide: adherence to  treatment, fear of suicide, moral objections to suicide, positive family relationships, and sense of responsibility toward family  Acute Risk of Harm to Self is Considered: low    Diagnosis: Obsessive compulsive disorder, history of anorexia, neurocognitive deficits probable mild cognitive impairment.    Treatment plan:  The FDA risks, benefits & alternatives to the medications prescribed were explained to you and your support person, your brother Obie, today. You &  your support person were able to understand & repeat these risks, benefits & alternatives to these prescribed medications. You and your support person, Raulito Willis, have agreed to proceed with treatment with the medications discussed based on the conclusion that the benefit outweighs the risks of this treatment regimen: continue aripiprazole 5 mg daily and venlafaxine XR 75 mg twice daily. You report taking alprazolam 0.25 mg at night.    We have your MRI and EKG  with you and your brother Raulito Willis. The MRI does show brain loss as discussed. The EKG results were not available.    Keep your appointment with Dr. Sabas Arcos for neuropsychological testing at .     Follow up in December of 2024 and March of 2025 at the [facility]  in addition to 4 to 6 weeks at the [facility] all in person with your brother, Obie.        Psych Review of Symptoms:    ADHD: Patient denied any symptoms.         Anxiety:   Generalized Anxiety Symptoms: Difficulty controlling worry and excessive worry.       Developmental and Sensory Concerns: Patient denied any symptoms.         Depressive Symptoms: Patient denied any symptoms.         Disruptive and Conduct Symptoms: Patient denied any symptoms.         Eating / Feeding Concerns: Patient denied any symptoms.         Elimination Symptoms: Patient denied any symptoms.         Manic Symptoms: Patient denied any symptoms.         Obsessive-Compulsive Symptoms:   Compulsive behaviors and obsessive behaviors.       Psychotic Symptoms: Patient denied any symptoms.           Trauma Related Symptoms: Patient denied any symptoms.           Sleep Concerns: Patient denied any symptoms.

## 2024-08-01 DIAGNOSIS — F41.9 ANXIETY AND DEPRESSION: ICD-10-CM

## 2024-08-01 DIAGNOSIS — F41.8 MIXED ANXIETY AND DEPRESSIVE DISORDER: ICD-10-CM

## 2024-08-01 DIAGNOSIS — F32.A ANXIETY AND DEPRESSION: ICD-10-CM

## 2024-08-01 RX ORDER — ALPRAZOLAM 0.25 MG/1
TABLET ORAL
Qty: 15 TABLET | Refills: 0 | OUTPATIENT
Start: 2024-08-01

## 2024-08-01 RX ORDER — ALPRAZOLAM 0.25 MG/1
TABLET ORAL
Qty: 15 TABLET | Refills: 2 | Status: SHIPPED | OUTPATIENT
Start: 2024-08-01

## 2024-10-10 ENCOUNTER — APPOINTMENT (OUTPATIENT)
Dept: BEHAVIORAL HEALTH | Facility: CLINIC | Age: 69
End: 2024-10-10
Payer: MEDICARE

## 2024-11-03 DIAGNOSIS — F41.8 MIXED ANXIETY AND DEPRESSIVE DISORDER: ICD-10-CM

## 2024-11-04 RX ORDER — ALPRAZOLAM 0.25 MG/1
TABLET ORAL
Qty: 15 TABLET | Refills: 2 | Status: SHIPPED | OUTPATIENT
Start: 2024-11-04

## 2024-11-14 ENCOUNTER — APPOINTMENT (OUTPATIENT)
Dept: PSYCHOLOGY | Facility: CLINIC | Age: 69
End: 2024-11-14
Payer: MEDICARE

## 2024-12-09 DIAGNOSIS — F41.9 ANXIETY AND DEPRESSION: ICD-10-CM

## 2024-12-09 DIAGNOSIS — F39 MOOD DISORDER (CMS-HCC): ICD-10-CM

## 2024-12-09 DIAGNOSIS — F32.A ANXIETY AND DEPRESSION: ICD-10-CM

## 2024-12-09 RX ORDER — ARIPIPRAZOLE 5 MG/1
5 TABLET ORAL
Qty: 90 TABLET | Refills: 0 | Status: SHIPPED | OUTPATIENT
Start: 2024-12-09 | End: 2025-03-09

## 2024-12-09 RX ORDER — VENLAFAXINE HYDROCHLORIDE 75 MG/1
75 CAPSULE, EXTENDED RELEASE ORAL
Qty: 90 CAPSULE | Refills: 0 | Status: SHIPPED | OUTPATIENT
Start: 2024-12-09 | End: 2025-03-09

## 2024-12-10 ENCOUNTER — APPOINTMENT (OUTPATIENT)
Dept: BEHAVIORAL HEALTH | Facility: CLINIC | Age: 69
End: 2024-12-10
Payer: MEDICARE

## 2025-01-29 DIAGNOSIS — F39 MOOD DISORDER (CMS-HCC): ICD-10-CM

## 2025-01-29 DIAGNOSIS — F32.A ANXIETY AND DEPRESSION: ICD-10-CM

## 2025-01-29 DIAGNOSIS — F41.9 ANXIETY AND DEPRESSION: ICD-10-CM

## 2025-01-30 ENCOUNTER — TELEPHONE (OUTPATIENT)
Dept: BEHAVIORAL HEALTH | Facility: CLINIC | Age: 70
End: 2025-01-30
Payer: MEDICARE

## 2025-01-30 DIAGNOSIS — F39 MOOD DISORDER (CMS-HCC): ICD-10-CM

## 2025-01-30 DIAGNOSIS — F32.A ANXIETY AND DEPRESSION: ICD-10-CM

## 2025-01-30 DIAGNOSIS — F41.9 ANXIETY AND DEPRESSION: ICD-10-CM

## 2025-01-30 RX ORDER — VENLAFAXINE HYDROCHLORIDE 75 MG/1
75 CAPSULE, EXTENDED RELEASE ORAL 2 TIMES DAILY
Qty: 180 CAPSULE | Refills: 0 | OUTPATIENT
Start: 2025-01-30

## 2025-01-30 RX ORDER — VENLAFAXINE HYDROCHLORIDE 75 MG/1
75 CAPSULE, EXTENDED RELEASE ORAL
Qty: 90 CAPSULE | Refills: 0 | Status: CANCELLED | OUTPATIENT
Start: 2025-01-30 | End: 2025-04-30

## 2025-01-30 RX ORDER — VENLAFAXINE HYDROCHLORIDE 75 MG/1
75 CAPSULE, EXTENDED RELEASE ORAL
Qty: 60 CAPSULE | Refills: 1 | Status: SHIPPED | OUTPATIENT
Start: 2025-01-30 | End: 2025-03-31

## 2025-01-30 RX ORDER — VENLAFAXINE HYDROCHLORIDE 75 MG/1
75 CAPSULE, EXTENDED RELEASE ORAL
Qty: 90 CAPSULE | Refills: 0 | Status: SHIPPED | OUTPATIENT
Start: 2025-01-30 | End: 2025-01-30 | Stop reason: SDUPTHER

## 2025-02-28 DIAGNOSIS — F41.8 MIXED ANXIETY AND DEPRESSIVE DISORDER: ICD-10-CM

## 2025-02-28 RX ORDER — ALPRAZOLAM 0.25 MG/1
TABLET ORAL
Qty: 15 TABLET | Refills: 0 | Status: SHIPPED | OUTPATIENT
Start: 2025-03-03

## 2025-03-11 ENCOUNTER — OFFICE VISIT (OUTPATIENT)
Dept: BEHAVIORAL HEALTH | Facility: CLINIC | Age: 70
End: 2025-03-11
Payer: MEDICARE

## 2025-03-11 VITALS
TEMPERATURE: 97.9 F | BODY MASS INDEX: 18.78 KG/M2 | SYSTOLIC BLOOD PRESSURE: 133 MMHG | RESPIRATION RATE: 20 BRPM | HEART RATE: 98 BPM | DIASTOLIC BLOOD PRESSURE: 81 MMHG | WEIGHT: 109.4 LBS

## 2025-03-11 DIAGNOSIS — Z79.899 MEDICATION MANAGEMENT: ICD-10-CM

## 2025-03-11 DIAGNOSIS — F39 MOOD DISORDER (CMS-HCC): ICD-10-CM

## 2025-03-11 DIAGNOSIS — F32.A ANXIETY AND DEPRESSION: ICD-10-CM

## 2025-03-11 DIAGNOSIS — F41.9 ANXIETY AND DEPRESSION: ICD-10-CM

## 2025-03-11 PROCEDURE — 99214 OFFICE O/P EST MOD 30 MIN: CPT | Performed by: PSYCHIATRY & NEUROLOGY

## 2025-03-11 PROCEDURE — 3075F SYST BP GE 130 - 139MM HG: CPT | Performed by: PSYCHIATRY & NEUROLOGY

## 2025-03-11 PROCEDURE — 1126F AMNT PAIN NOTED NONE PRSNT: CPT | Performed by: PSYCHIATRY & NEUROLOGY

## 2025-03-11 PROCEDURE — 1160F RVW MEDS BY RX/DR IN RCRD: CPT | Performed by: PSYCHIATRY & NEUROLOGY

## 2025-03-11 PROCEDURE — 99214 OFFICE O/P EST MOD 30 MIN: CPT | Mod: AM | Performed by: PSYCHIATRY & NEUROLOGY

## 2025-03-11 PROCEDURE — 1036F TOBACCO NON-USER: CPT | Performed by: PSYCHIATRY & NEUROLOGY

## 2025-03-11 PROCEDURE — 1159F MED LIST DOCD IN RCRD: CPT | Performed by: PSYCHIATRY & NEUROLOGY

## 2025-03-11 PROCEDURE — 3079F DIAST BP 80-89 MM HG: CPT | Performed by: PSYCHIATRY & NEUROLOGY

## 2025-03-11 RX ORDER — BUSPIRONE HYDROCHLORIDE 5 MG/1
5 TABLET ORAL 3 TIMES DAILY
Qty: 270 TABLET | Refills: 0 | Status: SHIPPED | OUTPATIENT
Start: 2025-03-11 | End: 2025-06-09

## 2025-03-11 RX ORDER — VENLAFAXINE HYDROCHLORIDE 75 MG/1
75 CAPSULE, EXTENDED RELEASE ORAL
Qty: 180 CAPSULE | Refills: 1 | Status: SHIPPED | OUTPATIENT
Start: 2025-03-11 | End: 2025-09-07

## 2025-03-11 SDOH — ECONOMIC STABILITY: FOOD INSECURITY: WITHIN THE PAST 12 MONTHS, YOU WORRIED THAT YOUR FOOD WOULD RUN OUT BEFORE YOU GOT MONEY TO BUY MORE.: NEVER TRUE

## 2025-03-11 SDOH — ECONOMIC STABILITY: FOOD INSECURITY: WITHIN THE PAST 12 MONTHS, THE FOOD YOU BOUGHT JUST DIDN'T LAST AND YOU DIDN'T HAVE MONEY TO GET MORE.: NEVER TRUE

## 2025-03-11 SDOH — ECONOMIC STABILITY: INCOME INSECURITY: IN THE LAST 12 MONTHS, WAS THERE A TIME WHEN YOU WERE NOT ABLE TO PAY THE MORTGAGE OR RENT ON TIME?: NO

## 2025-03-11 ASSESSMENT — SOCIAL DETERMINANTS OF HEALTH (SDOH)
HOW OFTEN DO YOU GET TOGETHER WITH FRIENDS OR RELATIVES?: MORE THAN THREE TIMES A WEEK
HOW OFTEN DO YOU ATTENT MEETINGS OF THE CLUB OR ORGANIZATION YOU BELONG TO?: 1 TO 4 TIMES PER YEAR
IN A TYPICAL WEEK, HOW MANY TIMES DO YOU TALK ON THE PHONE WITH FAMILY, FRIENDS, OR NEIGHBORS?: MORE THAN THREE TIMES A WEEK
IN THE PAST 12 MONTHS, HAS THE ELECTRIC, GAS, OIL, OR WATER COMPANY THREATENED TO SHUT OFF SERVICE IN YOUR HOME?: NO
DO YOU BELONG TO ANY CLUBS OR ORGANIZATIONS SUCH AS CHURCH GROUPS UNIONS, FRATERNAL OR ATHLETIC GROUPS, OR SCHOOL GROUPS?: YES
HOW OFTEN DO YOU ATTEND CHURCH OR RELIGIOUS SERVICES?: 1 TO 4 TIMES PER YEAR

## 2025-03-11 ASSESSMENT — ENCOUNTER SYMPTOMS
LOSS OF SENSATION IN FEET: 0
OCCASIONAL FEELINGS OF UNSTEADINESS: 1

## 2025-03-11 ASSESSMENT — PAIN SCALES - GENERAL: PAINLEVEL_OUTOF10: 0-NO PAIN

## 2025-03-11 NOTE — PROGRESS NOTES
Subjective   Patient ID: Ashanti Willis is a 69 y.o. female who presents for Follow-up. I have been nervous taking care of my mother who has dementia.     HPI: The patient reports that she is concerned about handling stresses. The patient is seen with her brother (Raulito Willis ). Obie reports that the patient tends to overspend and is compulsive. Obie manages the patient's finances and checks on the patient daily as his home is walking distance.      PMH: The patient had been seen by Dr. English. The patient was hospitalized for a day at [facility]. There were no history of suicide attempts. The patient has a history of ECT.      MSE: The patient is alert, fully oriented, language is intact, and recent and remote memory intact. The patient denies any suicidal or homicidal ideation or plans. The patient presents with anxious and depressive features without manic or psychotic symptoms. Thought is logical and clear. Judgment and insight are limited. The patient has had symptoms of disorganization, racing thoughts and sleep disturbance off aripiprazole which have remitted once she resumed the aripiprazole. A MoCA was completed on 4/24/24 and she scored a (23+1)/30. She scored 3/5 on visuospatial with not successfully completing the trail or cube. She was able to successfully do the clock. Recall was 4/5 with deficits in attention, concentration and language. She also has a hearing impairment. The patient has a history of obsessive and compulsive features as well.         Review of Systems   Neurological:         MSE: The patient is alert, fully oriented, language is intact, and recent and remote memory intact. The patient denies any suicidal or homicidal ideation or plans. The patient presents with anxious and depressive features without manic or psychotic symptoms. Thought is logical and clear. Judgment and insight are limited. The patient has had symptoms of disorganization, racing thoughts and sleep  disturbance off aripiprazole which have remitted once she resumed the aripiprazole. A MoCA was completed on 4/24/24 and she scored a (23+1)/30. She scored 3/5 on visuospatial with not successfully completing the trail or cube. She was able to successfully do the clock. Recall was 4/5 with deficits in attention, concentration and language. She also has a hearing impairment. The patient has a history of obsessive and compulsive features as well.        Psychiatric/Behavioral:          MSE: The patient is alert, fully oriented, language is intact, and recent and remote memory intact. The patient denies any suicidal or homicidal ideation or plans. The patient presents with anxious and depressive features without manic or psychotic symptoms. Thought is logical and clear. Judgment and insight are limited. The patient has had symptoms of disorganization, racing thoughts and sleep disturbance off aripiprazole which have remitted once she resumed the aripiprazole. A MoCA was completed on 4/24/24 and she scored a (23+1)/30. She scored 3/5 on visuospatial with not successfully completing the trail or cube. She was able to successfully do the clock. Recall was 4/5 with deficits in attention, concentration and language. She also has a hearing impairment. The patient has a history of obsessive and compulsive features as well.          Psych Review of Symptoms:    ADHD: Patient denied any symptoms.         Anxiety:   Generalized Anxiety Symptoms: Difficulty controlling worry and excessive worry.       Developmental and Sensory Concerns: Patient denied any symptoms.         Depressive Symptoms: Patient denied any symptoms.         Disruptive and Conduct Symptoms: Patient denied any symptoms.         Eating / Feeding Concerns: Patient denied any symptoms.         Elimination Symptoms: Patient denied any symptoms.         Manic Symptoms: Patient denied any symptoms.         Obsessive-Compulsive Symptoms: Patient denied any symptoms.          Psychotic Symptoms: Patient denied any symptoms.           Trauma Related Symptoms: Patient denied any symptoms.           Sleep Concerns: Patient denied any symptoms.             Objective   Physical Exam  Neurological:      Mental Status: She is oriented to person, place, and time.      Comments: MSE: The patient is alert, fully oriented, language is intact, and recent and remote memory intact. The patient denies any suicidal or homicidal ideation or plans. The patient presents with anxious and depressive features without manic or psychotic symptoms. Thought is logical and clear. Judgment and insight are limited. The patient has had symptoms of disorganization, racing thoughts and sleep disturbance off aripiprazole which have remitted once she resumed the aripiprazole. A MoCA was completed on 4/24/24 and she scored a (23+1)/30. She scored 3/5 on visuospatial with not successfully completing the trail or cube. She was able to successfully do the clock. Recall was 4/5 with deficits in attention, concentration and language. She also has a hearing impairment. The patient has a history of obsessive and compulsive features as well.        Psychiatric:      Comments: MSE: The patient is alert, fully oriented, language is intact, and recent and remote memory intact. The patient denies any suicidal or homicidal ideation or plans. The patient presents with anxious and depressive features without manic or psychotic symptoms. Thought is logical and clear. Judgment and insight are limited. The patient has had symptoms of disorganization, racing thoughts and sleep disturbance off aripiprazole which have remitted once she resumed the aripiprazole. A MoCA was completed on 4/24/24 and she scored a (23+1)/30. She scored 3/5 on visuospatial with not successfully completing the trail or cube. She was able to successfully do the clock. Recall was 4/5 with deficits in attention, concentration and language. She also has a  hearing impairment. The patient has a history of obsessive and compulsive features as well.              Lab Review:   No visits with results within 6 Month(s) from this visit.   Latest known visit with results is:   Office Visit on 04/24/2024   Component Date Value    Clonazepam 04/24/2024 <25     7-Aminoclonazepam 04/24/2024 <25     Alprazolam 04/24/2024 <25     Alpha-Hydroxyalprazolam 04/24/2024 <25     Midazolam 04/24/2024 <25     Alpha-Hydroxymidazolam 04/24/2024 <25     Chlordiazepoxide 04/24/2024 <25     Diazepam 04/24/2024 <25     Nordiazepam 04/24/2024 <25     Temazepam 04/24/2024 <25     Oxazepam 04/24/2024 <25     Lorazepam 04/24/2024 <25        Assessment/Plan   Psychiatric Risk Assessment  Violence Risk Assessment: none  Acute Risk of Harm to Others is Considered: low   Suicide Risk Assessment: age > 65 yrs old and   Protective Factors against Suicide: adherence to  treatment, fear of suicide, moral objections to suicide, positive family relationships, and sense of responsibility toward family  Acute Risk of Harm to Self is Considered: low    Imminent Risk of Suicide or Serious Self-Injury: Low   Chronic Risk of Suicide of Serious Self-Injury: Low  Risk factors: Age, depression history and   Protective factors: Denies current suicidal ideation, denies history of suicide attempts , willingness to seek help and support , gender, access to a variety of clinical interventions , and receiving and engaged in care for mental, physical, and substance use disorders      Imminent Risk of Violence or Homicide: Low   Risk Factors: No significant risk factors identified on screening  Protective Factors: Lack of known history of harm to others , Lack of known history of violent ideation , and lack of known access to firearms.     Assessment & Plan  Anxiety and depression  Diagnosis: Obsessive compulsive disorder, history of anorexia, neurocognitive deficits probable mild cognitive impairment.     Treatment  plan:  The FDA risks, benefits & alternatives to the medications prescribed were explained to you and your support person, your brother Obie, today. You & your support person were able to understand & repeat these risks, benefits & alternatives to these prescribed medications. You and your support person, Raulito Willis, have agreed to proceed with treatment with the medications discussed based on the conclusion that the benefit outweighs the risks of this treatment regimen: continue aripiprazole 5 mg daily and venlafaxine XR 75 mg twice daily. You report taking alprazolam 0.25 mg at night. We are starting buspirone 5 mg three times a day.     We have your MRI and EKG  with you and your brother Raulito Willis. The MRI does show brain loss as discussed. The EKG results were not available.     Keep your appointment with Dr. Sabas Arcos for neuropsychological testing at .      Follow up after your appointment with Dr. Arcos and sooner if needed with your brother, Obie, welcome with your agreement.   Orders:    Referral to Psychology; Future    Referral to Neuropsychology; Future    Benzodiazepine Confirmation, Urine    venlafaxine XR (Effexor XR) 75 mg 24 hr capsule; Take 1 capsule (75 mg) by mouth 2 times a day after meals. Do not crush or chew.    busPIRone (Buspar) 5 mg tablet; Take 1 tablet (5 mg) by mouth 3 times a day.    Mood disorder (CMS-[facility])  Diagnosis: Obsessive compulsive disorder, history of anorexia, neurocognitive deficits probable mild cognitive impairment.     Treatment plan:  The FDA risks, benefits & alternatives to the medications prescribed were explained to you and your support person, your brother Obie, today. You & your support person were able to understand & repeat these risks, benefits & alternatives to these prescribed medications. You and your support person, Raulito Willis, have agreed to proceed with treatment with the medications discussed based on the conclusion that the benefit  outweighs the risks of this treatment regimen: continue aripiprazole 5 mg daily and venlafaxine XR 75 mg twice daily. You report taking alprazolam 0.25 mg at night. We are starting buspirone 5 mg three times a day.     We have your MRI and EKG  with you and your brother Raulito Willis. The MRI does show brain loss as discussed. The EKG results were not available.     Keep your appointment with Dr. Sabas Arcos for neuropsychological testing at .      Follow up after your appointment with Dr. Arcos and sooner if needed with your brother, Obie, welcome with your agreement.   Orders:    Referral to Psychology; Future    Referral to Neuropsychology; Future    Benzodiazepine Confirmation, Urine    venlafaxine XR (Effexor XR) 75 mg 24 hr capsule; Take 1 capsule (75 mg) by mouth 2 times a day after meals. Do not crush or chew.    busPIRone (Buspar) 5 mg tablet; Take 1 tablet (5 mg) by mouth 3 times a day.    Medication management    Orders:    Benzodiazepine Confirmation, Urine    Time:   Prep time on date of the patient encounter: 5 minutes.   Time spent directly with patient/family/caregiver: 20 minutes.   Additional time spent on patient care activities:  minutes.   Documentation time: 5 minutes.   Total time on date of patient encounter:  30 minutes.

## 2025-03-11 NOTE — ASSESSMENT & PLAN NOTE
Diagnosis: Obsessive compulsive disorder, history of anorexia, neurocognitive deficits probable mild cognitive impairment.     Treatment plan:  The FDA risks, benefits & alternatives to the medications prescribed were explained to you and your support person, your brother Obie, today. You & your support person were able to understand & repeat these risks, benefits & alternatives to these prescribed medications. You and your support person, Raulito Willis, have agreed to proceed with treatment with the medications discussed based on the conclusion that the benefit outweighs the risks of this treatment regimen: continue aripiprazole 5 mg daily and venlafaxine XR 75 mg twice daily. You report taking alprazolam 0.25 mg at night. We are starting buspirone 5 mg three times a day.     We have your MRI and EKG  with you and your brother Raulito Willis. The MRI does show brain loss as discussed. The EKG results were not available.     Keep your appointment with Dr. Sabas Arcos for neuropsychological testing at .      Follow up after your appointment with Dr. Arcos and sooner if needed with your brother, Obie, welcome with your agreement.   Orders:    Referral to Psychology; Future    Referral to Neuropsychology; Future    Benzodiazepine Confirmation, Urine    venlafaxine XR (Effexor XR) 75 mg 24 hr capsule; Take 1 capsule (75 mg) by mouth 2 times a day after meals. Do not crush or chew.    busPIRone (Buspar) 5 mg tablet; Take 1 tablet (5 mg) by mouth 3 times a day.

## 2025-03-14 LAB
1OH-MIDAZOLAM UR-MCNC: NEGATIVE NG/ML
7AMINOCLONAZEPAM UR-MCNC: NEGATIVE NG/ML
A-OH ALPRAZ UR-MCNC: NEGATIVE NG/ML
A-OH-TRIAZOLAM UR-MCNC: NEGATIVE NG/ML
DRUG SCREEN COMMENT UR-IMP: NORMAL
LORAZEPAM UR-MCNC: NEGATIVE NG/ML
NORDIAZEPAM UR-MCNC: NEGATIVE NG/ML
OH-ETHYLFLURAZ UR-MCNC: NEGATIVE NG/ML
OXAZEPAM UR-MCNC: NEGATIVE NG/ML
QUEST NOTES AND COMMENTS: NORMAL
TEMAZEPAM UR-MCNC: NEGATIVE NG/ML

## 2025-03-18 DIAGNOSIS — F39 MOOD DISORDER (CMS-HCC): ICD-10-CM

## 2025-03-18 DIAGNOSIS — F41.9 ANXIETY AND DEPRESSION: ICD-10-CM

## 2025-03-18 DIAGNOSIS — F32.A ANXIETY AND DEPRESSION: ICD-10-CM

## 2025-03-18 RX ORDER — VENLAFAXINE HYDROCHLORIDE 75 MG/1
75 CAPSULE, EXTENDED RELEASE ORAL
Qty: 180 CAPSULE | Refills: 1 | Status: SHIPPED | OUTPATIENT
Start: 2025-03-18 | End: 2025-09-14

## 2025-03-18 RX ORDER — BUSPIRONE HYDROCHLORIDE 5 MG/1
5 TABLET ORAL 3 TIMES DAILY
Qty: 270 TABLET | Refills: 1 | Status: SHIPPED | OUTPATIENT
Start: 2025-03-18 | End: 2025-09-14

## 2025-03-18 RX ORDER — VENLAFAXINE HYDROCHLORIDE 75 MG/1
CAPSULE, EXTENDED RELEASE ORAL
Refills: 0 | OUTPATIENT
Start: 2025-03-18

## 2025-03-18 RX ORDER — BUSPIRONE HYDROCHLORIDE 5 MG/1
TABLET ORAL
Refills: 0 | OUTPATIENT
Start: 2025-03-18

## 2025-03-18 RX ORDER — ARIPIPRAZOLE 5 MG/1
5 TABLET ORAL EVERY MORNING
Qty: 90 TABLET | Refills: 1 | Status: SHIPPED | OUTPATIENT
Start: 2025-03-18 | End: 2025-09-14

## 2025-03-18 RX ORDER — ARIPIPRAZOLE 5 MG/1
5 TABLET ORAL EVERY MORNING
Qty: 90 TABLET | Refills: 1 | OUTPATIENT
Start: 2025-03-18 | End: 2025-09-14

## 2025-03-19 RX ORDER — ARIPIPRAZOLE 5 MG/1
5 TABLET ORAL EVERY MORNING
Qty: 90 TABLET | Refills: 3 | OUTPATIENT
Start: 2025-03-19 | End: 2026-03-19

## 2025-03-28 DIAGNOSIS — F41.9 ANXIETY AND DEPRESSION: ICD-10-CM

## 2025-03-28 DIAGNOSIS — F32.A ANXIETY AND DEPRESSION: ICD-10-CM

## 2025-03-28 RX ORDER — ARIPIPRAZOLE 5 MG/1
5 TABLET ORAL EVERY MORNING
Qty: 90 TABLET | Refills: 1 | Status: SHIPPED | OUTPATIENT
Start: 2025-03-28 | End: 2025-09-24

## 2025-03-31 ENCOUNTER — TELEPHONE (OUTPATIENT)
Dept: BEHAVIORAL HEALTH | Facility: CLINIC | Age: 70
End: 2025-03-31
Payer: MEDICARE

## 2025-03-31 DIAGNOSIS — F41.9 ANXIETY AND DEPRESSION: ICD-10-CM

## 2025-03-31 DIAGNOSIS — F32.A ANXIETY AND DEPRESSION: ICD-10-CM

## 2025-03-31 DIAGNOSIS — F39 MOOD DISORDER (CMS-HCC): ICD-10-CM

## 2025-03-31 RX ORDER — VENLAFAXINE HYDROCHLORIDE 75 MG/1
75 CAPSULE, EXTENDED RELEASE ORAL
Qty: 60 CAPSULE | Refills: 0 | Status: SHIPPED | OUTPATIENT
Start: 2025-03-31 | End: 2025-04-30

## 2025-03-31 RX ORDER — VENLAFAXINE HYDROCHLORIDE 75 MG/1
75 CAPSULE, EXTENDED RELEASE ORAL
Qty: 180 CAPSULE | Refills: 3 | OUTPATIENT
Start: 2025-03-31 | End: 2026-03-31

## 2025-04-01 DIAGNOSIS — F41.8 MIXED ANXIETY AND DEPRESSIVE DISORDER: ICD-10-CM

## 2025-04-02 DIAGNOSIS — F41.8 MIXED ANXIETY AND DEPRESSIVE DISORDER: ICD-10-CM

## 2025-04-02 RX ORDER — ALPRAZOLAM 0.25 MG/1
TABLET ORAL
Qty: 15 TABLET | Refills: 0 | OUTPATIENT
Start: 2025-04-02

## 2025-04-02 RX ORDER — ALPRAZOLAM 0.25 MG/1
TABLET ORAL
Qty: 15 TABLET | Refills: 0 | Status: SHIPPED | OUTPATIENT
Start: 2025-04-02

## 2025-04-28 DIAGNOSIS — F41.9 ANXIETY AND DEPRESSION: ICD-10-CM

## 2025-04-28 DIAGNOSIS — F39 MOOD DISORDER (CMS-HCC): ICD-10-CM

## 2025-04-28 DIAGNOSIS — F32.A ANXIETY AND DEPRESSION: ICD-10-CM

## 2025-04-28 RX ORDER — VENLAFAXINE HYDROCHLORIDE 75 MG/1
75 CAPSULE, EXTENDED RELEASE ORAL
Qty: 60 CAPSULE | Refills: 0 | Status: SHIPPED | OUTPATIENT
Start: 2025-04-28 | End: 2025-05-28

## 2025-05-05 DIAGNOSIS — F41.8 MIXED ANXIETY AND DEPRESSIVE DISORDER: ICD-10-CM

## 2025-05-05 RX ORDER — ALPRAZOLAM 0.25 MG/1
TABLET ORAL
Qty: 15 TABLET | Refills: 0 | Status: SHIPPED | OUTPATIENT
Start: 2025-05-05

## 2025-05-06 DIAGNOSIS — F41.8 MIXED ANXIETY AND DEPRESSIVE DISORDER: ICD-10-CM

## 2025-05-06 RX ORDER — ALPRAZOLAM 0.25 MG/1
TABLET ORAL
Qty: 15 TABLET | Refills: 0 | OUTPATIENT
Start: 2025-05-06

## 2025-06-02 DIAGNOSIS — F32.A ANXIETY AND DEPRESSION: ICD-10-CM

## 2025-06-02 DIAGNOSIS — F41.9 ANXIETY AND DEPRESSION: ICD-10-CM

## 2025-06-02 DIAGNOSIS — F39 MOOD DISORDER: ICD-10-CM

## 2025-06-02 DIAGNOSIS — F41.8 MIXED ANXIETY AND DEPRESSIVE DISORDER: ICD-10-CM

## 2025-06-02 RX ORDER — VENLAFAXINE HYDROCHLORIDE 75 MG/1
75 CAPSULE, EXTENDED RELEASE ORAL
Qty: 180 CAPSULE | Refills: 1 | Status: SHIPPED | OUTPATIENT
Start: 2025-06-02 | End: 2025-06-04 | Stop reason: SDUPTHER

## 2025-06-02 RX ORDER — ALPRAZOLAM 0.25 MG/1
TABLET ORAL
Qty: 15 TABLET | Refills: 0 | Status: SHIPPED | OUTPATIENT
Start: 2025-06-05

## 2025-06-04 ENCOUNTER — TELEPHONE (OUTPATIENT)
Dept: BEHAVIORAL HEALTH | Facility: CLINIC | Age: 70
End: 2025-06-04
Payer: MEDICARE

## 2025-06-04 DIAGNOSIS — F41.9 ANXIETY AND DEPRESSION: ICD-10-CM

## 2025-06-04 DIAGNOSIS — F39 MOOD DISORDER: ICD-10-CM

## 2025-06-04 DIAGNOSIS — F32.A ANXIETY AND DEPRESSION: ICD-10-CM

## 2025-06-04 RX ORDER — VENLAFAXINE HYDROCHLORIDE 75 MG/1
75 CAPSULE, EXTENDED RELEASE ORAL
Qty: 14 CAPSULE | Refills: 0 | Status: SHIPPED | OUTPATIENT
Start: 2025-06-04 | End: 2025-06-11

## 2025-08-21 ENCOUNTER — TELEPHONE (OUTPATIENT)
Dept: OTHER | Age: 70
End: 2025-08-21
Payer: MEDICARE

## 2025-08-29 DIAGNOSIS — F32.A ANXIETY AND DEPRESSION: ICD-10-CM

## 2025-08-29 DIAGNOSIS — F41.9 ANXIETY AND DEPRESSION: ICD-10-CM

## 2025-08-29 RX ORDER — ARIPIPRAZOLE 5 MG/1
5 TABLET ORAL
Qty: 90 TABLET | Refills: 0 | Status: SHIPPED | OUTPATIENT
Start: 2025-08-29